# Patient Record
Sex: MALE | Race: WHITE
[De-identification: names, ages, dates, MRNs, and addresses within clinical notes are randomized per-mention and may not be internally consistent; named-entity substitution may affect disease eponyms.]

---

## 2018-03-30 ENCOUNTER — HOSPITAL ENCOUNTER (EMERGENCY)
Dept: HOSPITAL 25 - ED | Age: 28
LOS: 1 days | Discharge: HOME | End: 2018-03-31
Payer: OTHER GOVERNMENT

## 2018-03-30 DIAGNOSIS — F10.129: ICD-10-CM

## 2018-03-30 DIAGNOSIS — R45.851: Primary | ICD-10-CM

## 2018-03-30 LAB
BASOPHILS # BLD AUTO: 0.1 10^3/UL (ref 0–0.2)
EOSINOPHIL # BLD AUTO: 0.1 10^3/UL (ref 0–0.6)
HCT VFR BLD AUTO: 42 % (ref 42–52)
HGB BLD-MCNC: 14.6 G/DL (ref 14–18)
LYMPHOCYTES # BLD AUTO: 2.8 10^3/UL (ref 1–4.8)
MCH RBC QN AUTO: 32 PG (ref 27–31)
MCHC RBC AUTO-ENTMCNC: 35 G/DL (ref 31–36)
MCV RBC AUTO: 92 FL (ref 80–94)
MONOCYTES # BLD AUTO: 0.7 10^3/UL (ref 0–0.8)
NEUTROPHILS # BLD AUTO: 4.1 10^3/UL (ref 1.5–7.7)
NRBC # BLD AUTO: 0 10^3/UL
NRBC BLD QL AUTO: 0
PLATELET # BLD AUTO: 302 10^3/UL (ref 150–450)
RBC # BLD AUTO: 4.61 10^6/UL (ref 4–5.4)
WBC # BLD AUTO: 7.7 10^3/UL (ref 3.5–10.8)

## 2018-03-30 PROCEDURE — 81003 URINALYSIS AUTO W/O SCOPE: CPT

## 2018-03-30 PROCEDURE — 84443 ASSAY THYROID STIM HORMONE: CPT

## 2018-03-30 PROCEDURE — 80053 COMPREHEN METABOLIC PANEL: CPT

## 2018-03-30 PROCEDURE — 80320 DRUG SCREEN QUANTALCOHOLS: CPT

## 2018-03-30 PROCEDURE — 99284 EMERGENCY DEPT VISIT MOD MDM: CPT

## 2018-03-30 PROCEDURE — 85025 COMPLETE CBC W/AUTO DIFF WBC: CPT

## 2018-03-30 PROCEDURE — 80329 ANALGESICS NON-OPIOID 1 OR 2: CPT

## 2018-03-30 PROCEDURE — G0480 DRUG TEST DEF 1-7 CLASSES: HCPCS

## 2018-03-30 PROCEDURE — 93005 ELECTROCARDIOGRAM TRACING: CPT

## 2018-03-30 PROCEDURE — 36415 COLL VENOUS BLD VENIPUNCTURE: CPT

## 2018-03-30 PROCEDURE — 80307 DRUG TEST PRSMV CHEM ANLYZR: CPT

## 2018-03-31 VITALS — DIASTOLIC BLOOD PRESSURE: 88 MMHG | SYSTOLIC BLOOD PRESSURE: 152 MMHG

## 2018-03-31 RX ADMIN — NICOTINE PRN MG: 4 INHALANT RESPIRATORY (INHALATION) at 16:01

## 2018-03-31 RX ADMIN — NICOTINE PRN MG: 4 INHALANT RESPIRATORY (INHALATION) at 14:15

## 2018-03-31 RX ADMIN — NICOTINE PRN MG: 4 INHALANT RESPIRATORY (INHALATION) at 10:11

## 2018-03-31 RX ADMIN — NICOTINE PRN MG: 4 INHALANT RESPIRATORY (INHALATION) at 07:57

## 2018-03-31 NOTE — ED
Vinay BONILLA Tiffany, scribed for Alexa Arnold MD on 03/31/18 at 1353 .





Progress





- Progress Note


Progress Note: 


This patient is a 28 year old male presenting with suicidal ideation and 

alcoholism, requesting alcohol detox. I was aksed to evaluate patient by Becky mental health evaluator because [facility] 

because he is a  of the [place]. 


Patient reports that he is not currently suicidal because he is sober. Only 

wants to stay away from alcohol, does not want to be involuntarily admitted. 

States he has guns locked up in his home. Denies chest pain, abdominal pain, 

shortness of breath and nausea. 





Patient is a  with history of depression and suicidal ideation, both of 

which worsen when drinking. Also has history of alcoholism. States family 

history of depression on maternal and paternal sides. Requests gabapentin 

medication, states he takes 600 mg three times daily, had one dose yesterday 

and none today.





Course/Dx





- Course


Course Of Treatment: Patient medications reviewed this visit. Since patient is 

a , [facility]. Patient 

cannot be [place] [facility] [place] beds are full. Per [place], Dr. Marin 

evaluated patient and found that patient does not meet admission or transfer 

criteria. Patient will be discharged, agreeable to this plan.





Discharge





- Sign-Out/Discharge


Documenting (check all that apply): Discharge





- Discharge Plan


Condition: Stable


Disposition: HOME


Patient Education Materials:  Alcohol Withdrawal (ED), Suicide Prevention for 

Adults (ED), Alcohol Use Disorder (ED)


Referrals: 


Ananya He [Primary Care Provider] - 





- Billing Disposition and Condition


Condition: STABLE


Disposition: HOME





The documentation as recorded by the Vinay wright Tiffany accurately reflects 

the service I personally performed and the decisions made by me, Alexa Arnold MD.

## 2018-03-31 NOTE — PN
Subjective





- Subjective


Date of Service: 03/31/18


Service Type: 82762 Hosp care 35 min high complexity


Subjective: 





Deshawn is a 27 y/o [place]  with no prior psychiatric hospitalization 

who brought self to the [facility] ED due to alcohol intoxication and suicidal 

thoughts without any plans. Deshawn has a established diagnosis of PTSD for 

which he is 40% service connected and [facility] 

and also has a counselor. He is inconsistant with his appointments and 

treatments rather keeps drinking almost on regular basis. Says he trie to drink 

modestly but unable to do so. He was prescribed Antabuse and he never took it. 

Never had gone for rehab ar even self help groups. He is employed, in school, 

single with no children but has lots of drinking buddies. Does not think he has 

any problem with alcohol and not ready for rehab.


During the assessment he denied ongoing mood, thoughts or perceptual 

disturbances. Denies current thoughts for self harm or harming others. Thinks 

about suicide when intoxicated and knows how to ask for help and that's why he 

came to the ED yesterday. Today he is showing a superficial interest to try 

rehab if approved by [facility].


No physical health problems and labs are WNL. Not in any withdrawals.





Objective





- Appearance


Appearance: Well Developed/Nourished


Dysmorphic Features: No


Hygiene: Normal


Grooming: Fairly Well Kept





- Behavior


Psychomotor Activities: Normal


Exhibits Abnormal Movement: No





- Attitude and Relatedness


Attitude and Relatedness: Appropriate


Eye Contact: Fair





- Speech


Quality: Unpressured


Latencies: Normal


Quantity: Appropriate





- Mood


Patient's Decription of Mood: "Fine"





- Affect


Observed Affect: Good


Affect Consistent with: Euthymia





- Thought Process


Patient's Thought Process: Coherent, Goal Directed


Thought Content: No Passive Death Wish, No Suicidal Planning, No Homicidal 

Ideation, No Paranoid Ideation





- Sensorium


Experiencing Hallucinations: No, Sensorium is Clear


Type of Hallucinations: Visual: No, Auditory: No, Command: No





- Level of Consciousness


Level of Consciousness: Alert


Orientation: Yes Intact, Yes Orientated to Time, Yes Orientated to Place, Yes 

Orientated to Person





- Impulse Control


Impulse Control: Intact





- Insight and Judgement


Insight and Judgement: Fair





Assessment





- Assessment


Merits Inpatient Hospitalization: Pending Safe DC Plan


Clinical Impression: 





27 y/o WM with h/o PTSD from [place] war who also has an extensive h/o Alcohol use 

d/o presented to the ED intoxicated and suicidal at that time. Plan was to 

admit him om [facility] for his safety and treatments. An attempt to transfer him to 

other hospital preferably [facility] was unsuccessful due to their resistance 

and we didn't have any bed here. Patient this morning sobered up and denies any 

suicidal/homicidal thoughts and is not in acute psychiatric crisis either. 

Hence at this time we don't see any clinical reason to hold or transfer him to 

[facility] against his will. Safe act reporting was done and we will call 

his support system and give him referal to see this [facility] providers and a referal 

to Local rehab programs.





Plan





- Plan


Treatment Plan: 


Name: DESHAWN ORTIZ                        


YOB: 1990                        


[id number]


[id number]











Continued Medication Management: Continue Outpt Medication


Medications: 


 Current Medications





Acetaminophen (Tylenol Tab*)  650 mg PO Q4H PRN


   PRN Reason: PAIN


Device (Nicotine Mouth Piece*)  1 each INH .USE WITH NICOTROL PRN


   PRN Reason: CRAVING


   Last Admin: 03/30/18 19:48 Dose:  1 each


Device (Nicotine Mouth Piece*)  1 each INH .USE WITH NICOTROL PRN


   PRN Reason: CRAVING


Folic Acid (Folvite Tab*)  1 mg PO DAILY JORGE


Lorazepam (Ativan Tab(*))  0 - 6 mg PO .PER [facility] PROTOCOL JORGE


   PRN Reason: Protocol


   Last Admin: 03/31/18 13:04 Dose:  2 mg


Multivitamins/Minerals (Theragran/Minerals Tab*)  1 tab PO DAILY [facility]


Nicotine (Nicotine Inhaler*)  10 mg INH Q2H PRN


   PRN Reason: CRAVING


   Last Admin: 03/31/18 14:15 Dose:  10 mg


Thiamine HCl (Vitamin B-1 Tab*)  100 mg PO DAILY JORGE











- Discharge Plan


Discharge Plan: Drug/Alcohol Rehab


Outpatient Program: [facility]

## 2018-03-31 NOTE — ED
Samy BONILLA Sixian, scribed for Salbador Rodriguez MD on 03/31/18 at 0600 .





Progress





- Progress Note


Progress Note: 


This patient is a 28 year old M presenting to ED with a chief complaint of SI 

and alcohol intoxication. The pt was evaluated and medically cleared for 

transfer. 





- Results/Orders


Results/Orders: 











General: well-appearing, no pain distress


Skin: warm, color reflects adequate perfusion, dry


Head: normal


Eyes: EOMI, ALANIS


ENT: normal


Neck: supple, nontender


Respiratory: CTA, breath sounds present


Cardiovascular: RRR


Abdomen: soft, nontender


Bowel: present


Musculoskeletal: normal, strength/ROM intact


Neurological: normal, sensory/motor intact, A&O x3


Psychological: affect/mood appropriate





- Consult/PCP


Time Called: 21:00





Course/Dx





- Course


Course Of Treatment: BP noted and advised to follow up with PCP.  Medications 

reviewed.  Patient was cleared medically for MHE.  After his MHE he is to be 

admitted to a MHU.  Transfer to another MHU as [facility] has no bed.





- Diagnoses


Provider Diagnoses: 


 Alcohol abuse, Suicidal ideation








Discharge





- Sign-Out/Discharge


Documenting (check all that apply): Discharge - Transfer to another mental 

health facility as [facility]





- Discharge Plan


Condition: Stable


Disposition: PSYCHIATRIC FACILITY-OTHER


Referrals: 


Ananya He [Primary Care Provider] - 


Additional Instructions: 


RETURN TO THE EMERGENCY DEPARTMENT FOR CHANGING OR WORSENING SYMPTOMS.





- Billing Disposition and Condition


Condition: STABLE


Disposition: PSY-OTH





The documentation as recorded by the Samy wright Sixian accurately reflects 

the service I personally performed and the decisions made by me, Salbador Rodriguez MD.

## 2018-03-31 NOTE — PN
ED Flex Patient Progress Note


Date of Service: 03/31/18


Subjective:  


This is a 28 year-old M who is pending transfer to another psychiatric facility 

secondary to SI and ETOH intoxication


Pt states slept okay.  He has a mild headache requesting ibuprofen for. 





 





Objective: 


Vitals: Most recent vital signs documented below. General NAD, Alert and 

oriented x3.


Heart: rrr at 70 bpm


Lungs: CTA or with rales, rhonchi, wheezing


abd: soft nontender


Laboratory: Current laboratory results documented below. 


 








Assessment:


 SI








Plan: Pending psychiatric to transfer will follow up daily until accepted at 

facility


condition:Stable


disposition:transfer 





 Vital Signs











Temp Pulse Resp BP Pulse Ox


 


 97.5 F   83   18   150/58   100 


 


 03/31/18 07:59  03/31/18 07:59  03/31/18 07:59  03/31/18 07:59  03/31/18 07:59











 Lab Results - Entire Visit











  03/30/18 03/30/18 03/30/18





  17:30 17:30 16:27


 


WBC    7.7


 


RBC    4.61


 


Hgb    14.6


 


Hct    42


 


MCV    92


 


MCH    32 H


 


MCHC    35


 


RDW    14


 


Plt Count    302


 


MPV    7.2 L


 


Neut % (Auto)    52.7


 


Lymph % (Auto)    35.8


 


Mono % (Auto)    8.5 H


 


Eos % (Auto)    1.9


 


Baso % (Auto)    1.1


 


Absolute Neuts (auto)    4.1


 


Absolute Lymphs (auto)    2.8


 


Absolute Monos (auto)    0.7


 


Absolute Eos (auto)    0.1


 


Absolute Basos (auto)    0.1


 


Absolute Nucleated RBC    0


 


Nucleated RBC %    0


 


Sodium   


 


Potassium   


 


Chloride   


 


Carbon Dioxide   


 


Anion Gap   


 


BUN   


 


Creatinine   


 


Est GFR ( Amer)   


 


Est GFR (Non-Af Amer)   


 


BUN/Creatinine Ratio   


 


Glucose   


 


Calcium   


 


Total Bilirubin   


 


AST   


 


ALT   


 


Alkaline Phosphatase   


 


Total Protein   


 


Albumin   


 


Globulin   


 


Albumin/Globulin Ratio   


 


TSH   


 


Urine Color  Yellow  


 


Urine Appearance  Clear  


 


Urine pH  5.0  


 


Ur Specific Gravity  1.012  


 


Urine Protein  Negative  


 


Urine Ketones  Negative  


 


Urine Blood  Negative  


 


Urine Nitrate  Negative  


 


Urine Bilirubin  Negative  


 


Urine Urobilinogen  Negative  


 


Ur Leukocyte Esterase  Negative  


 


Urine Glucose  Negative  


 


Salicylates   


 


Urine Opiates Screen   None detected 


 


Acetaminophen   


 


Ur Barbiturates Screen   None detected 


 


Ur Phencyclidine Scrn   None detected 


 


Ur Amphetamines Screen   None detected 


 


U Benzodiazepines Scrn   None detected 


 


Urine Cocaine Screen   None detected 


 


U Cannabinoids Screen   None detected 


 


Serum Alcohol   














  03/30/18





  16:27


 


WBC 


 


RBC 


 


Hgb 


 


Hct 


 


MCV 


 


MCH 


 


MCHC 


 


RDW 


 


Plt Count 


 


MPV 


 


Neut % (Auto) 


 


Lymph % (Auto) 


 


Mono % (Auto) 


 


Eos % (Auto) 


 


Baso % (Auto) 


 


Absolute Neuts (auto) 


 


Absolute Lymphs (auto) 


 


Absolute Monos (auto) 


 


Absolute Eos (auto) 


 


Absolute Basos (auto) 


 


Absolute Nucleated RBC 


 


Nucleated RBC % 


 


Sodium  139


 


Potassium  3.9


 


Chloride  103


 


Carbon Dioxide  27


 


Anion Gap  9


 


BUN  12


 


Creatinine  0.87


 


Est GFR ( Amer)  132.5


 


Est GFR (Non-Af Amer)  103.0


 


BUN/Creatinine Ratio  13.8


 


Glucose  105 H


 


Calcium  9.3


 


Total Bilirubin  0.30


 


AST  31


 


ALT  19


 


Alkaline Phosphatase  48


 


Total Protein  7.3


 


Albumin  4.2


 


Globulin  3.1


 


Albumin/Globulin Ratio  1.4


 


TSH  0.54


 


Urine Color 


 


Urine Appearance 


 


Urine pH 


 


Ur Specific Gravity 


 


Urine Protein 


 


Urine Ketones 


 


Urine Blood 


 


Urine Nitrate 


 


Urine Bilirubin 


 


Urine Urobilinogen 


 


Ur Leukocyte Esterase 


 


Urine Glucose 


 


Salicylates  < 2.50


 


Urine Opiates Screen 


 


Acetaminophen  < 15


 


Ur Barbiturates Screen 


 


Ur Phencyclidine Scrn 


 


Ur Amphetamines Screen 


 


U Benzodiazepines Scrn 


 


Urine Cocaine Screen 


 


U Cannabinoids Screen 


 


Serum Alcohol  183 H